# Patient Record
Sex: MALE | Race: AMERICAN INDIAN OR ALASKA NATIVE
[De-identification: names, ages, dates, MRNs, and addresses within clinical notes are randomized per-mention and may not be internally consistent; named-entity substitution may affect disease eponyms.]

---

## 2019-08-19 ENCOUNTER — HOSPITAL ENCOUNTER (EMERGENCY)
Dept: HOSPITAL 5 - ED | Age: 74
End: 2019-08-19
Payer: OTHER GOVERNMENT

## 2019-08-19 DIAGNOSIS — I46.9: Primary | ICD-10-CM

## 2019-08-19 DIAGNOSIS — E11.9: ICD-10-CM

## 2019-08-19 DIAGNOSIS — Z87.442: ICD-10-CM

## 2019-08-19 DIAGNOSIS — I10: ICD-10-CM

## 2019-08-19 DIAGNOSIS — Z86.73: ICD-10-CM

## 2019-08-19 PROCEDURE — 92950 HEART/LUNG RESUSCITATION CPR: CPT

## 2019-08-19 PROCEDURE — 99285 EMERGENCY DEPT VISIT HI MDM: CPT

## 2019-08-19 PROCEDURE — 31500 INSERT EMERGENCY AIRWAY: CPT

## 2019-08-19 NOTE — EMERGENCY DEPARTMENT REPORT
ED CPR HPI





- General


Stated Complaint: CARDIAC ARREST


Time Seen by Provider: 19 01:25


Source: EMS


Mode of arrival: Stretcher


Limitations: Altered Mental Status, Physical Limitation





- History of Present Illness


Initial Comments: 





Patient is a 74-year-old patient presents for cardiac arrest.  Report received 

from EMS.  EMS intubated the patient.  Patient has received multiple rounds of 

CPR and ACLS medications.  EMS placed a right IO


MD Complaint: found unresponsive


-: minute(s)


Place: NH/SNF


Bystander CPR Performed: No


AED Applied by Bystander/: No


Shock Advised: No


Initial Findings in the Field: unresponsive, no respirations, no pulse


ROSC in the Field: No


Associated Injuries: No


Treatments Prior to Arrival: intubation, BMV, chest compressions, epinephrine 

mgs #





ED Review of Systems


ROS: 


Stated complaint: CARDIAC ARREST


Other details as noted in HPI





Comment: Unobtainable due to pts medical conditions





ED Past Medical Hx





- Past Medical History


Previous Medical History?: Yes


Hx Hypertension: Yes


Hx CVA: Yes


Hx Diabetes: Yes


Hx Renal Disease: Yes





- Surgical History


Past Surgical History?: Yes


Additional Surgical History: PEG tube.  Dialysis shunt





- Family History


Family history: no significant





- Social History


Smoking Status: Unknown if ever smoked


Substance Use Type: None





ED Physical Exam





- General


Limitations: Altered Mental Status, Physical Limitation


General appearance: obtunded





- Head


Head exam: Present: atraumatic, normocephalic





- Eye


Eye exam: Present: other (pupils fixed and dilated)





- ENT


ENT exam: Present: mucous membranes dry, other (GI contents in oropharynx)





- Neck


Neck exam: Present: normal inspection





- Respiratory


Respiratory exam: Present: other





- Cardiovascular


Cardiovascular Exam: Present: other (no pulse and asystole on monitor)





- GI/Abdominal


GI/Abdominal exam: Present: soft, other (gastric tube noted)





- Rectal


Rectal exam: Present: deferred





- Extremities Exam


Extremities exam: Present: normal inspection, other (right lower extremity IO)





- Neurological Exam


Neurological exam: Present: altered





- Skin


Skin exam: Present: warm, dry





ED Course





- Reevaluation(s)


Reevaluation #1: 


Patient arrive via EMS.  Patient noted to be in asystole.  Patient noted to have

GI contents inside the ET tube and no breath sounds and sounds over the 

epigastrium.  Patient extubated and reintubated.  See procedure note.


19 01:25





Reevaluation #2: 


Resuscitation efforts discontinued due to no pulse and no signs of life.  No 

respiratory motion.  No cardiac motion.  Asystole on the monitor.  See code note


19 01:34














ED Medical Decision Making





- Medical Decision Making





Patient is a 74-year-old male presents emergency department for cardiac arrest. 

Patient's resuscitation efforts terminated due no signs of life.  The patient 

was immediately reintubated.  Code ran according to ACLS guidelines.  Patient 

was extubated upon arrival due to ET tube being in the stomach.  Patient had 

multiple medical problems. 





- Differential Diagnosis


cardiac arrest


Critical Care Time: Yes


Critical care attestation.: 


If time is entered above; I have spent that time in minutes in the direct care 

of this critically ill patient, excluding procedure time.





Critical Care Time: 





35 minutes





ED Disposition


Clinical Impression: 


 Cardiac arrest





Disposition: DC-20 


Is pt being admited?: No


Does the pt Need Aspirin: No


Condition: Stable


Time of Disposition: 02:07